# Patient Record
Sex: MALE | Race: WHITE | Employment: STUDENT | URBAN - METROPOLITAN AREA
[De-identification: names, ages, dates, MRNs, and addresses within clinical notes are randomized per-mention and may not be internally consistent; named-entity substitution may affect disease eponyms.]

---
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
## 2024-10-28 ENCOUNTER — OFFICE VISIT (OUTPATIENT)
Dept: FAMILY MEDICINE CLINIC | Age: 24
End: 2024-10-28
Payer: COMMERCIAL

## 2024-10-28 VITALS
HEART RATE: 86 BPM | HEIGHT: 71 IN | OXYGEN SATURATION: 98 % | BODY MASS INDEX: 33.4 KG/M2 | SYSTOLIC BLOOD PRESSURE: 110 MMHG | DIASTOLIC BLOOD PRESSURE: 70 MMHG | WEIGHT: 238.6 LBS

## 2024-10-28 DIAGNOSIS — Z00.00 ROUTINE GENERAL MEDICAL EXAMINATION AT A HEALTH CARE FACILITY: Primary | ICD-10-CM

## 2024-10-28 PROCEDURE — 99385 PREV VISIT NEW AGE 18-39: CPT | Performed by: NURSE PRACTITIONER

## 2024-10-28 RX ORDER — PANTOPRAZOLE SODIUM 40 MG/1
40 FOR SUSPENSION ORAL
COMMUNITY

## 2024-10-28 SDOH — ECONOMIC STABILITY: FOOD INSECURITY: WITHIN THE PAST 12 MONTHS, YOU WORRIED THAT YOUR FOOD WOULD RUN OUT BEFORE YOU GOT MONEY TO BUY MORE.: NEVER TRUE

## 2024-10-28 SDOH — ECONOMIC STABILITY: FOOD INSECURITY: WITHIN THE PAST 12 MONTHS, THE FOOD YOU BOUGHT JUST DIDN'T LAST AND YOU DIDN'T HAVE MONEY TO GET MORE.: NEVER TRUE

## 2024-10-28 SDOH — ECONOMIC STABILITY: INCOME INSECURITY: HOW HARD IS IT FOR YOU TO PAY FOR THE VERY BASICS LIKE FOOD, HOUSING, MEDICAL CARE, AND HEATING?: NOT HARD AT ALL

## 2024-10-28 ASSESSMENT — ENCOUNTER SYMPTOMS
CHEST TIGHTNESS: 0
SHORTNESS OF BREATH: 0
BACK PAIN: 0
RHINORRHEA: 0
EYE ITCHING: 0
ABDOMINAL PAIN: 0
DIARRHEA: 0
BLOOD IN STOOL: 0
COLOR CHANGE: 0
NAUSEA: 0
WHEEZING: 0
CONSTIPATION: 0
SORE THROAT: 0
VOMITING: 0
EYE REDNESS: 0
SINUS PRESSURE: 0
COUGH: 0

## 2024-10-28 ASSESSMENT — PATIENT HEALTH QUESTIONNAIRE - PHQ9
SUM OF ALL RESPONSES TO PHQ QUESTIONS 1-9: 1
SUM OF ALL RESPONSES TO PHQ9 QUESTIONS 1 & 2: 1
SUM OF ALL RESPONSES TO PHQ QUESTIONS 1-9: 1
1. LITTLE INTEREST OR PLEASURE IN DOING THINGS: NOT AT ALL
2. FEELING DOWN, DEPRESSED OR HOPELESS: SEVERAL DAYS

## 2024-10-28 NOTE — PROGRESS NOTES
Pradeep Mac (:  2000) is a 24 y.o. male,New patient, here for evaluation of the following chief complaint(s):  New Patient (Pt is here today to become established. Pt c/o nasal congestion. Pt c/o ankle pain. )      ASSESSMENT/PLAN:  1. Routine general medical examination at a health care facility  Assessment & Plan:  Physical exam complete during visit today.  Reviewed preventative care and health maintenance.  Orders placed as documented.         No follow-ups on file.    SUBJECTIVE/OBJECTIVE:  In the office as a new patient to establish care. Has concerns about his ankle. His previous MD looked at it, didn't think at that time imaging was indicated. Exams have been normal. Does not wear ankle splint.  He is asymptomatic today.  Has history of gerd, followed by GI and is taking protonix daily     Current Outpatient Medications   Medication Sig Dispense Refill    pantoprazole sodium (PROTONIX) 40 MG PACK packet Take 1 packet by mouth every morning (before breakfast)      ibuprofen (IBU) 600 MG tablet Take 1 tablet by mouth every 8 hours as needed for Pain Take with food. 12 tablet 0    EPINEPHrine (EPIPEN 2-THEO) 0.3 MG/0.3ML SOAJ injection Inject 0.3 mLs into the muscle once for 1 dose Use as directed for allergic reaction 0.3 mL 1    fluticasone (FLONASE) 50 MCG/ACT nasal spray 2 sprays by Nasal route daily. 3 Bottle 1    loratadine (CLARITIN) 10 MG tablet Take 1 tablet by mouth daily. (Patient not taking: Reported on 10/28/2024) 30 tablet 2     No current facility-administered medications for this visit.         Review of Systems   Constitutional:  Negative for chills, fatigue and fever.   HENT:  Negative for congestion, ear pain, postnasal drip, rhinorrhea, sinus pressure, sneezing and sore throat.    Eyes:  Negative for redness and itching.   Respiratory:  Negative for cough, chest tightness, shortness of breath and wheezing.    Cardiovascular:  Negative for chest pain and palpitations.

## 2024-12-17 ENCOUNTER — OFFICE VISIT (OUTPATIENT)
Dept: FAMILY MEDICINE CLINIC | Age: 24
End: 2024-12-17

## 2024-12-17 VITALS
WEIGHT: 243.8 LBS | BODY MASS INDEX: 34 KG/M2 | RESPIRATION RATE: 16 BRPM | SYSTOLIC BLOOD PRESSURE: 124 MMHG | OXYGEN SATURATION: 97 % | DIASTOLIC BLOOD PRESSURE: 86 MMHG | TEMPERATURE: 97.3 F | HEART RATE: 83 BPM

## 2024-12-17 DIAGNOSIS — H66.92 ACUTE LEFT OTITIS MEDIA: Primary | ICD-10-CM

## 2024-12-17 ASSESSMENT — ENCOUNTER SYMPTOMS
CONSTIPATION: 0
WHEEZING: 0
NAUSEA: 0
CHEST TIGHTNESS: 0
SINUS PRESSURE: 0
COUGH: 0
RHINORRHEA: 0
VOMITING: 0
COLOR CHANGE: 0
BACK PAIN: 0
ABDOMINAL PAIN: 0
BLOOD IN STOOL: 0
DIARRHEA: 0
SORE THROAT: 0
EYE REDNESS: 0
SHORTNESS OF BREATH: 0
EYE ITCHING: 0

## 2024-12-17 NOTE — ASSESSMENT & PLAN NOTE
Otitis media left ear amoxicillin/clavulanate to pharmacy.  Informed patient to never put anything in his ears.

## 2024-12-17 NOTE — PROGRESS NOTES
Patient's wife called for an appt for patient who is c/o upper respiratory symptoms/cough x about 3 wks and asked for an appt tomorrow since patient is working today.  I offered several afternoon appt times tomorrow w/Dr. Fraga, however, she informed me he needs something in the morning.  I advised to have him go to [facility] which opens up at 8:00 am.  She was agreeable.    Last seen 3/6/2024  Next appt 9/12/2024    Requested Prescriptions      No prescriptions requested or ordered in this encounter         Negative for cold intolerance and heat intolerance.   Genitourinary:  Negative for difficulty urinating, dysuria, flank pain, frequency, hematuria and urgency.   Musculoskeletal:  Negative for arthralgias, back pain, joint swelling and myalgias.   Skin:  Negative for color change, pallor, rash and wound.   Allergic/Immunologic: Negative for environmental allergies and food allergies.   Neurological:  Negative for dizziness, seizures, syncope, weakness, light-headedness, numbness and headaches.   Hematological:  Negative for adenopathy. Does not bruise/bleed easily.   Psychiatric/Behavioral:  Negative for confusion, sleep disturbance and suicidal ideas. The patient is not nervous/anxious and is not hyperactive.        Vitals:    12/17/24 1205   BP: 124/86   Pulse: 83   Resp: 16   Temp: 97.3 °F (36.3 °C)   TempSrc: Temporal   SpO2: 97%   Weight: 110.6 kg (243 lb 12.8 oz)       Physical Exam  Constitutional:       Appearance: Normal appearance.   HENT:      Head: Normocephalic and atraumatic.      Left Ear: Tympanic membrane is erythematous and bulging.      Nose: Nose normal.   Eyes:      Extraocular Movements: Extraocular movements intact.      Conjunctiva/sclera: Conjunctivae normal.      Pupils: Pupils are equal, round, and reactive to light.   Pulmonary:      Effort: Pulmonary effort is normal.   Musculoskeletal:         General: Normal range of motion.      Cervical back: Normal range of motion.   Skin:     Coloration: Skin is not jaundiced or pale.      Findings: No bruising, erythema, lesion or rash.   Neurological:      Mental Status: He is alert and oriented to person, place, and time. Mental status is at baseline.   Psychiatric:         Mood and Affect: Mood normal.         Behavior: Behavior normal.         Thought Content: Thought content normal.         Judgment: Judgment normal.                 An electronic signature was used to authenticate this note.    --Jessi Serna, APRN - CNP

## 2024-12-18 ENCOUNTER — TELEPHONE (OUTPATIENT)
Dept: FAMILY MEDICINE CLINIC | Age: 24
End: 2024-12-18

## 2024-12-18 RX ORDER — AMOXICILLIN 500 MG/1
500 CAPSULE ORAL 2 TIMES DAILY
Qty: 14 CAPSULE | Refills: 0 | Status: SHIPPED | OUTPATIENT
Start: 2024-12-18 | End: 2024-12-25

## 2024-12-18 NOTE — TELEPHONE ENCOUNTER
Pt given Augmentin yesterday and took first dose around 130-200pm, and 1 hr later pt started having a burning feeling in his throat that went into his chest. Lasted about 4 hrs. Took 2nd dose around 1am and 1 hr later same symptoms.  No hives or rash, no SOB, but had trouble sleeping.  Pt did not take rx today. He thought is could be his GERDS and took Pantoprazole with no help.  He was on Biaxin about 2 yrs through [facility] and had a burning feeling and fast heart rate. They changed it to Amoxicillin, which he was ok on, with no reaction.  Please advise

## 2024-12-26 ENCOUNTER — TELEPHONE (OUTPATIENT)
Dept: FAMILY MEDICINE CLINIC | Age: 24
End: 2024-12-26

## 2024-12-26 NOTE — TELEPHONE ENCOUNTER
----- Message from Natasha KLEIN sent at 12/26/2024 10:56 AM EST -----  Regarding: [facility] Appointment Request  [facility] Appointment Request    Patient needs appointment for [facility] Appointment Type: New to Provider.     Patient Requested Dates(s): as soon as possible  Patient Requested Time:any time  Provider Name: Any available doctor    Reason for Appointment Request: Established Patient - Available appointments did not meet patient need  --------------------------------------------------------------------------------------------------------------------------    Relationship to Patient: Self     Call Back Information: OK to leave message on voicemail  Preferred Call Back Number: Phone [phone]

## 2024-12-27 NOTE — TELEPHONE ENCOUNTER
Call to the patient about setting up an appointment.  No answer. LMOM to call and schedule an appointment.

## 2024-12-30 ENCOUNTER — OFFICE VISIT (OUTPATIENT)
Dept: FAMILY MEDICINE CLINIC | Age: 24
End: 2024-12-30
Payer: COMMERCIAL

## 2024-12-30 VITALS
SYSTOLIC BLOOD PRESSURE: 112 MMHG | BODY MASS INDEX: 34.23 KG/M2 | DIASTOLIC BLOOD PRESSURE: 80 MMHG | HEART RATE: 75 BPM | WEIGHT: 245.4 LBS | TEMPERATURE: 97.2 F | RESPIRATION RATE: 16 BRPM | OXYGEN SATURATION: 99 %

## 2024-12-30 DIAGNOSIS — K62.5 RECTAL BLEEDING: ICD-10-CM

## 2024-12-30 DIAGNOSIS — E66.09 CLASS 1 OBESITY DUE TO EXCESS CALORIES WITH BODY MASS INDEX (BMI) OF 34.0 TO 34.9 IN ADULT, UNSPECIFIED WHETHER SERIOUS COMORBIDITY PRESENT: ICD-10-CM

## 2024-12-30 DIAGNOSIS — K62.5 RECTAL BLEEDING: Primary | ICD-10-CM

## 2024-12-30 DIAGNOSIS — E66.811 CLASS 1 OBESITY DUE TO EXCESS CALORIES WITH BODY MASS INDEX (BMI) OF 34.0 TO 34.9 IN ADULT, UNSPECIFIED WHETHER SERIOUS COMORBIDITY PRESENT: ICD-10-CM

## 2024-12-30 DIAGNOSIS — R74.8 ELEVATED LIVER ENZYMES: ICD-10-CM

## 2024-12-30 PROBLEM — H66.92 ACUTE LEFT OTITIS MEDIA: Status: RESOLVED | Noted: 2024-12-17 | Resolved: 2024-12-30

## 2024-12-30 PROCEDURE — 99214 OFFICE O/P EST MOD 30 MIN: CPT | Performed by: NURSE PRACTITIONER

## 2024-12-30 NOTE — ASSESSMENT & PLAN NOTE
Noted from lab studies done from 2018.  It appears this was likely secondary to mono.  Care Everywhere notes notes resolution of this in 2023.  Will recheck labs again today in addition to CBC.

## 2024-12-30 NOTE — ASSESSMENT & PLAN NOTE
Chronic, worsening (exacerbation), lifestyle modifications recommended  Discussed with patient the fact that his obesity is likely going to have the largest impact on his general physical wellbeing.  Encouraged proper diet and increased exercise.

## 2024-12-30 NOTE — PROGRESS NOTES
Pradeep Mac (:  2000) is a 24 y.o. male,Established patient, here for evaluation of the following chief complaint(s):  Ear Problem (Bilateral ear infection)         Assessment & Plan  Rectal bleeding  Chronic and recurrent.  Will check labs today.  Patient will need internal examination completed to rule out hemorrhoids versus disease process.  Orders:    Oliver Mendez MD, Gastroenterology ([facility]), [facility]    Comprehensive Metabolic Panel; Future    CBC with Auto Differential; Future    Class 1 obesity due to excess calories with body mass index (BMI) of 34.0 to 34.9 in adult, unspecified whether serious comorbidity present   Chronic, worsening (exacerbation), lifestyle modifications recommended  Discussed with patient the fact that his obesity is likely going to have the largest impact on his general physical wellbeing.  Encouraged proper diet and increased exercise.         Elevated liver enzymes    Noted from lab studies done from 2018.  It appears this was likely secondary to mono.  Care Everywhere notes notes resolution of this in .  Will recheck labs again today in addition to CBC.           No follow-ups on file.       Subjective   Patient presents today for follow-up on his bilateral ear infection.  States he was treated and has had full relief of his discomfort.  He does complain of rectal bleeding this has been on and off for a number of years.  He was told in the past that this was due to hemorrhoids and sitting on the toilet too long.  He has not had an internal examination.  States he notices blood in his stool and on the tissue when he wipes every couple of weeks.  It does not follow large bowel movements however.  Denies any blood in his underpants.  Denies any bleeding elsewhere.  Reports a remote family history of colon cancer.      Allergies   Allergen Reactions    Latex Rash    Hazelnut      Chest pain.     Shellfish-Derived Products Swelling     Throat swells up

## 2024-12-31 LAB
ALBUMIN SERPL-MCNC: 4.7 G/DL (ref 3.4–5)
ALBUMIN/GLOB SERPL: 1.9 {RATIO} (ref 1.1–2.2)
ALP SERPL-CCNC: 67 U/L (ref 40–129)
ALT SERPL-CCNC: 106 U/L (ref 10–40)
ANION GAP SERPL CALCULATED.3IONS-SCNC: 11 MMOL/L (ref 3–16)
AST SERPL-CCNC: 45 U/L (ref 15–37)
BASOPHILS # BLD: 0 K/UL (ref 0–0.2)
BASOPHILS NFR BLD: 0.5 %
BILIRUB SERPL-MCNC: 0.4 MG/DL (ref 0–1)
BUN SERPL-MCNC: 13 MG/DL (ref 7–20)
CALCIUM SERPL-MCNC: 9.9 MG/DL (ref 8.3–10.6)
CHLORIDE SERPL-SCNC: 97 MMOL/L (ref 99–110)
CO2 SERPL-SCNC: 28 MMOL/L (ref 21–32)
CREAT SERPL-MCNC: 0.8 MG/DL (ref 0.9–1.3)
DEPRECATED RDW RBC AUTO: 13 % (ref 12.4–15.4)
EOSINOPHIL # BLD: 0.2 K/UL (ref 0–0.6)
EOSINOPHIL NFR BLD: 2.7 %
GFR SERPLBLD CREATININE-BSD FMLA CKD-EPI: >90 ML/MIN/{1.73_M2}
GLUCOSE SERPL-MCNC: 87 MG/DL (ref 70–99)
HCT VFR BLD AUTO: 42.2 % (ref 40.5–52.5)
HGB BLD-MCNC: 14.5 G/DL (ref 13.5–17.5)
LYMPHOCYTES # BLD: 3.2 K/UL (ref 1–5.1)
LYMPHOCYTES NFR BLD: 43.2 %
MCH RBC QN AUTO: 30.1 PG (ref 26–34)
MCHC RBC AUTO-ENTMCNC: 34.4 G/DL (ref 31–36)
MCV RBC AUTO: 87.3 FL (ref 80–100)
MONOCYTES # BLD: 0.4 K/UL (ref 0–1.3)
MONOCYTES NFR BLD: 5.5 %
NEUTROPHILS # BLD: 3.6 K/UL (ref 1.7–7.7)
NEUTROPHILS NFR BLD: 48.1 %
PLATELET # BLD AUTO: 242 K/UL (ref 135–450)
PMV BLD AUTO: 9.1 FL (ref 5–10.5)
POTASSIUM SERPL-SCNC: 4.3 MMOL/L (ref 3.5–5.1)
PROT SERPL-MCNC: 7.2 G/DL (ref 6.4–8.2)
RBC # BLD AUTO: 4.83 M/UL (ref 4.2–5.9)
SODIUM SERPL-SCNC: 136 MMOL/L (ref 136–145)
WBC # BLD AUTO: 7.4 K/UL (ref 4–11)

## 2025-02-13 ENCOUNTER — OFFICE VISIT (OUTPATIENT)
Dept: FAMILY MEDICINE CLINIC | Age: 25
End: 2025-02-13

## 2025-02-13 VITALS
WEIGHT: 235.9 LBS | SYSTOLIC BLOOD PRESSURE: 132 MMHG | RESPIRATION RATE: 16 BRPM | TEMPERATURE: 97.3 F | OXYGEN SATURATION: 98 % | HEART RATE: 84 BPM | DIASTOLIC BLOOD PRESSURE: 86 MMHG | BODY MASS INDEX: 32.9 KG/M2

## 2025-02-13 DIAGNOSIS — K64.4 EXTERNAL BLEEDING HEMORRHOIDS: Primary | ICD-10-CM

## 2025-02-13 DIAGNOSIS — H92.03 EAR PAIN, BILATERAL: ICD-10-CM

## 2025-02-13 SDOH — ECONOMIC STABILITY: FOOD INSECURITY: WITHIN THE PAST 12 MONTHS, THE FOOD YOU BOUGHT JUST DIDN'T LAST AND YOU DIDN'T HAVE MONEY TO GET MORE.: NEVER TRUE

## 2025-02-13 SDOH — ECONOMIC STABILITY: FOOD INSECURITY: WITHIN THE PAST 12 MONTHS, YOU WORRIED THAT YOUR FOOD WOULD RUN OUT BEFORE YOU GOT MONEY TO BUY MORE.: NEVER TRUE

## 2025-02-13 ASSESSMENT — ENCOUNTER SYMPTOMS
ANAL BLEEDING: 1
RECTAL PAIN: 1

## 2025-02-13 ASSESSMENT — PATIENT HEALTH QUESTIONNAIRE - PHQ9
SUM OF ALL RESPONSES TO PHQ QUESTIONS 1-9: 0
1. LITTLE INTEREST OR PLEASURE IN DOING THINGS: NOT AT ALL
SUM OF ALL RESPONSES TO PHQ9 QUESTIONS 1 & 2: 0
SUM OF ALL RESPONSES TO PHQ QUESTIONS 1-9: 0
2. FEELING DOWN, DEPRESSED OR HOPELESS: NOT AT ALL
SUM OF ALL RESPONSES TO PHQ QUESTIONS 1-9: 0
SUM OF ALL RESPONSES TO PHQ QUESTIONS 1-9: 0

## 2025-02-13 NOTE — ASSESSMENT & PLAN NOTE
Upon examination, there is no evidence of infection or cerumen impaction in the ears. The presence of a small amount of hair was noted. He is advised to administer Flonase nasal spray, 2 puffs twice daily, for a few days to alleviate symptoms. If the Flonase does not resolve the itchiness or drainage, it may be due to wax, which is normal.  He is to notify me if symptoms don't improve

## 2025-02-13 NOTE — PROGRESS NOTES
Pradeep Mac (:  2000) is a 24 y.o. male,Established patient, here for evaluation of the following chief complaint(s):  Ear Pain (Left ear worse ), Hemorrhoids, and Results      ASSESSMENT/PLAN:  1. External bleeding hemorrhoids  Assessment & Plan:  He reports consistent issues with hemorrhoids, including bleeding and pain. Blood work was normal. A referral to Dr. Davies, a colorectal specialist, will be initiated for further evaluation and potential banding procedure. Refused exam today   Orders:  -     Oscar Mendez MD, Colorectal Surgery, [facility]  2. Ear pain, bilateral  Assessment & Plan:  Upon examination, there is no evidence of infection or cerumen impaction in the ears. The presence of a small amount of hair was noted. He is advised to administer Flonase nasal spray, 2 puffs twice daily, for a few days to alleviate symptoms. If the Flonase does not resolve the itchiness or drainage, it may be due to wax, which is normal.  He is to notify me if symptoms don't improve      Assessment & Plan      No follow-ups on file.    SUBJECTIVE/OBJECTIVE:    History of Present Illness  The patient is a 24-year-old male who presents for evaluation of ear problems and hemorrhoids.    Approximately 1.5 months ago, he was evaluated by Jessi for an ear infection. Initially, he was prescribed a medication that caused an adverse reaction, necessitating a change in treatment. The subsequent medication proved effective; however, the symptoms have since recurred. He reports experiencing pain in his left ear, accompanied by a yellowish discharge. Additionally, he notes the presence of unusual yellow flakes. Recently, he has begun to experience similar symptoms in his right ear. He has not attempted to use a decongestant for symptom relief. He has not had any recent colds.    He has been dealing with persistent hemorrhoids, which have reached a severity that prompts him to seek medical intervention. He describes

## 2025-02-13 NOTE — ASSESSMENT & PLAN NOTE
He reports consistent issues with hemorrhoids, including bleeding and pain. Blood work was normal. A referral to Dr. Davies, a colorectal specialist, will be initiated for further evaluation and potential banding procedure. Refused exam today

## 2025-07-07 DIAGNOSIS — Z91.013 SHELLFISH ALLERGY: ICD-10-CM

## 2025-07-07 RX ORDER — EPINEPHRINE 0.3 MG/.3ML
0.3 INJECTION SUBCUTANEOUS ONCE
Qty: 0.3 ML | Refills: 1 | Status: SHIPPED | OUTPATIENT
Start: 2025-07-07 | End: 2025-07-07

## 2025-07-07 RX ORDER — PANTOPRAZOLE SODIUM 40 MG/1
40 FOR SUSPENSION ORAL
Qty: 30 EACH | Refills: 2 | Status: SHIPPED | OUTPATIENT
Start: 2025-07-07

## 2025-07-08 RX ORDER — PANTOPRAZOLE SODIUM 40 MG/1
40 TABLET, DELAYED RELEASE ORAL DAILY
Qty: 90 TABLET | Refills: 0 | Status: SHIPPED | OUTPATIENT
Start: 2025-07-08

## 2025-07-28 ENCOUNTER — OFFICE VISIT (OUTPATIENT)
Dept: ORTHOPEDIC SURGERY | Age: 25
End: 2025-07-28
Payer: COMMERCIAL

## 2025-07-28 ENCOUNTER — OFFICE VISIT (OUTPATIENT)
Dept: FAMILY MEDICINE CLINIC | Age: 25
End: 2025-07-28
Payer: COMMERCIAL

## 2025-07-28 VITALS
TEMPERATURE: 96.9 F | SYSTOLIC BLOOD PRESSURE: 110 MMHG | RESPIRATION RATE: 16 BRPM | OXYGEN SATURATION: 98 % | HEART RATE: 72 BPM | DIASTOLIC BLOOD PRESSURE: 78 MMHG | BODY MASS INDEX: 31.49 KG/M2 | WEIGHT: 225.8 LBS

## 2025-07-28 VITALS — WEIGHT: 225 LBS | HEIGHT: 71 IN | BODY MASS INDEX: 31.5 KG/M2

## 2025-07-28 DIAGNOSIS — M25.552 LEFT HIP PAIN: Primary | ICD-10-CM

## 2025-07-28 PROCEDURE — 99214 OFFICE O/P EST MOD 30 MIN: CPT | Performed by: NURSE PRACTITIONER

## 2025-07-28 PROCEDURE — 99204 OFFICE O/P NEW MOD 45 MIN: CPT | Performed by: ORTHOPAEDIC SURGERY

## 2025-07-28 ASSESSMENT — PATIENT HEALTH QUESTIONNAIRE - PHQ9
SUM OF ALL RESPONSES TO PHQ QUESTIONS 1-9: 0
SUM OF ALL RESPONSES TO PHQ QUESTIONS 1-9: 0
2. FEELING DOWN, DEPRESSED OR HOPELESS: NOT AT ALL
SUM OF ALL RESPONSES TO PHQ QUESTIONS 1-9: 0
1. LITTLE INTEREST OR PLEASURE IN DOING THINGS: NOT AT ALL
SUM OF ALL RESPONSES TO PHQ QUESTIONS 1-9: 0

## 2025-07-28 ASSESSMENT — ENCOUNTER SYMPTOMS
EYE ITCHING: 0
NAUSEA: 0
BLOOD IN STOOL: 0
SINUS PRESSURE: 0
SORE THROAT: 0
DIARRHEA: 0
VOMITING: 0
CONSTIPATION: 0
CHEST TIGHTNESS: 0
COUGH: 0
COLOR CHANGE: 0
EYE REDNESS: 0
SHORTNESS OF BREATH: 0
BACK PAIN: 0
WHEEZING: 0
RHINORRHEA: 0
ABDOMINAL PAIN: 0

## 2025-07-28 NOTE — PROGRESS NOTES
Pradeep Mac (:  2000) is a 25 y.o. male,Established patient, here for evaluation of the following chief complaint(s):  Hip Pain (Left hip pain for 5 days. Possible due to the ocean waves/current being strong)      ASSESSMENT/PLAN:  1. Left hip pain      Assessment & Plan  1. Left hip pain.  - Reports left hip pain for the past 5 days, exacerbated by certain movements and pressure.  - Physical exam shows pain with certain movements and pressure on the left hip.  - History of SCFE surgery on the left hip at age 13 or 14; last imaging in .  - X-ray of the left hip ordered; referral to orthopedics made for further evaluation and management.    No follow-ups on file.    SUBJECTIVE/OBJECTIVE:    History of Present Illness  The patient is a 25-year-old male who presents for left hip pain.    He reports experiencing severe pain in his left hip, particularly when sitting, standing up, or turning while walking. This discomfort has been present for approximately 5 days. He does not recall any recent injury to the area. 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 The last imaging of his left hip was conducted in . He was informed that he might experience issues with his hip as he ages. His right hip remains unaffected.    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      Current Outpatient Medications   Medication Sig Dispense Refill    fluticasone (FLONASE) 50 MCG/ACT nasal spray 2 sprays by Nasal route daily. 3 Bottle 1    loratadine (CLARITIN) 10 MG tablet Take 1 tablet by mouth daily. 30 tablet 2    diclofenac (VOLTAREN) 50 MG EC tablet Take 1 tablet by mouth 2 times daily (with meals) 60 tablet 3    pantoprazole (PROTONIX) 40 MG tablet TAKE 1 TABLET BY MOUTH EVERY DAY (Patient not taking: Reported on 2025) 90 tablet 0    EPINEPHrine (EPIPEN

## 2025-07-28 NOTE — PROGRESS NOTES
7/28/2025     Reason for visit:  Left hip pain    History of Present Illness:  This is a 25-year-old male with pertinent surgical history of a left hip pinning for SCFE back in 2014 who presents with left hip pain.  Patient reports that he did well postoperatively from his hip pinning up until last week.  Patient reports that he was swimming in [facility] in the ocean and states that he felt like he was swimming against the current and start experiencing left groin pain.  There was not any specific traumatic incident.  He states that the pain has gotten worse when he is sitting for prolonged amount of time.  He also does have pain when he is going from a sitting to standing position.  He does not have any pain at rest.  He has utilized some ibuprofen/Advil with some relief.  He has not done any physical therapy or injections of any kind.  He denies any numbness or tingling.    Of note, the patient works at [employer] which does require him to lift    Medical History:  Past Medical History:   Diagnosis Date    GERD (gastroesophageal reflux disease)       Past Surgical History:   Procedure Laterality Date    HIP SURGERY        Family History   Problem Relation Age of Onset    Arthritis Maternal Grandmother     Diabetes Maternal Grandfather       Social History     Socioeconomic History    Marital status: Single     Spouse name: Not on file    Number of children: Not on file    Years of education: Not on file    Highest education level: Not on file   Occupational History    Not on file   Tobacco Use    Smoking status: Never    Smokeless tobacco: Current    Tobacco comments:     vaps   Vaping Use    Vaping status: Some Days    Substances: Nicotine   Substance and Sexual Activity    Alcohol use: Yes     Comment: once every other week    Drug use: No    Sexual activity: Yes     Partners: Female     Comment: condoms   Other Topics Concern    Not on file   Social History Narrative    Not on file     Social Drivers of